# Patient Record
(demographics unavailable — no encounter records)

---

## 2024-10-15 NOTE — PHYSICAL EXAM
[General Appearance - Well Developed] : well developed [General Appearance - Well Nourished] : well nourished [General Appearance - In No Acute Distress] : no acute distress [Normal Conjunctiva] : the conjunctiva exhibited no abnormalities [Normal Oral Mucosa] : normal oral mucosa [] : no respiratory distress [Respiration, Rhythm And Depth] : normal respiratory rhythm and effort [Auscultation Breath Sounds / Voice Sounds] : lungs were clear to auscultation bilaterally [Heart Sounds] : normal S1 and S2 [Arterial Pulses Normal] : the arterial pulses were normal [Edema] : no peripheral edema present [Abdomen Soft] : soft [Abdomen Tenderness] : non-tender [Abnormal Walk] : normal gait [Nail Clubbing] : no clubbing of the fingernails [Cyanosis, Localized] : no localized cyanosis [Oriented To Time, Place, And Person] : oriented to person, place, and time [Impaired Insight] : insight and judgment were intact [Affect] : the affect was normal [FreeTextEntry1] : Irregular rate and rhythm

## 2024-10-15 NOTE — ASSESSMENT
[FreeTextEntry1] : Persistent atrial fibrillation\par  S/P Watchman device\par  Hypertension\par  DM\par  Obesity\par  Hypothyroidism\par

## 2024-10-15 NOTE — DISCUSSION/SUMMARY
[FreeTextEntry1] : The patient is followed by EPS and has seen Dr. Montanez on 9 visits since her one office visit with me She is advised to remain under the care of EP. She was advised to lower her T. cholesterol level to less than 200 mg/dl and LDL to less than 100 mg/dl. Start an exercise program. Maintain a low fat, low cholesterol diet. Weight reduction is advised. Patient is back on  mg and was taken off eliquis by EP.  She is advised to continue her remaining medications. Her metoprolol tartrate was increased to 50 mg Q12h and her diovan dose was decreased to 80 mg QD Repeat  CBC BMP, lipid and hepatic panel was reviewed with the patient and her daughter. Follow up with Dr. Montanez.  Follow up in 6 months [EKG obtained to assist in diagnosis and management of assessed problem(s)] : EKG obtained to assist in diagnosis and management of assessed problem(s)

## 2024-10-15 NOTE — REASON FOR VISIT
[FreeTextEntry1] : Patient presents for a follow up Cardiology visit and review of her lab results. She is followed by Dr. Montanez and has seen him on the last 9 office visits. She is s/p implant of a Watchman device.

## 2025-02-06 NOTE — PROCEDURE
[Large Joint Injection] : Large joint injection [Right] : of the right [Shoulder] : shoulder [Pain] : pain [X-ray evidence of Osteoarthritis on this or prior visit] : x-ray evidence of Osteoarthritis on this or prior visit [Alcohol] : alcohol [Ethyl Chloride sprayed topically] : ethyl chloride sprayed topically [Sterile technique used] : sterile technique used [___ cc    0.5%] : Bupivacaine (Marcaine) ~Vcc of 0.5%  [____] : [unfilled] [] : Patient tolerated procedure well [Call if redness, pain or fever occur] : call if redness, pain or fever occur [Apply ice for 15min out of every hour for the next 12-24 hours as tolerated] : apply ice for 15 minutes out of every hour for the next 12-24 hours as tolerated [Previous OTC use and PT nontherapeutic] : patient has tried OTC's including aspirin, Ibuprofen, Aleve, etc or prescription NSAIDS, and/or exercises at home and/or physical therapy without satisfactory response [Patient had decreased mobility in the joint] : patient had decreased mobility in the joint [Risks, benefits, alternatives discussed / Verbal consent obtained] : the risks benefits, and alternatives have been discussed, and verbal consent was obtained [All ultrasound images have been permanently captured and stored accordingly in our picture archiving and communication system] : All ultrasound images have been permanently captured and stored accordingly in our picture archiving and communication system [Visualization of the needle and placement of injection was performed without complication] : visualization of the needle and placement of injection was performed without complication [de-identified] : Chlorhexidine

## 2025-02-06 NOTE — DISCUSSION/SUMMARY
[de-identified] : A discussion regarding available pain management treatment options occurred with the patient. These included interventional, rehabilitative, pharmacological, and alternative modalities. We will proceed with the following:   Interventional treatment options: 1. Done in office today: Right shoulder steroid injection  The risks, benefits and alternatives of the proposed procedure were explained in detail with the patient.  The risks outlined include, but are not limited to, infection, bleeding, nerve injury, a temporary increase in pain, failure to resolve symptoms, allergic reaction, and possible elevation of blood sugar in diabetics.  All questions were answered to patient's apparent satisfaction and he/she verbalized an understanding.  2. Proceed with a Right shoulder Synvisc injection x3.   - Follow up 2 weeks post injection.   I Brianna Yan attest that this documentation has been prepared under the direction and in the presence of provider Dr. Salazar Rendon.  The documentation recorded by the scribe in my presence, accurately reflects the service I personally performed, and the decisions made by me with my edits as appropriate.   Salazar Rendon, DO

## 2025-02-06 NOTE — PHYSICAL EXAM
[de-identified] : Right shoulder     pain with flexion, abduction AROM   pain with resisted Abduction    + hawkin's   + Stapleton's    no weakness appreciated   no TTP to posterior GH joint, proximal biceps tendon   L shoulder exam  No rashes, erythema, edema  TTP at anterior & posterior GH joint Limited ROM 2/2 to pain Oglesby sign: negative O'briens test: negative LUE: 5/5 strength except 4+/5 deltoid

## 2025-02-06 NOTE — HISTORY OF PRESENT ILLNESS
[FreeTextEntry1] : Ms. Vargas is a 81-year-old female presenting to establish care for her right shoulder pain. She is accompanied by her daughter who is acting as a  as she does not speak English. Her pain as been ongoing for the last 2 years however over the last couple of months, the pain has been progressing. She does have bilateral shoulder pain however the right side is more severe. Her pain is associated with sharp, stabbing, throbbing and aching sensations. She has significant limitation in her range of motion due to the pain. Her pain is rated at a 8/10 on the pain scale. Her pain is present daily and limits her ADLs.

## 2025-02-24 NOTE — PHYSICAL EXAM
[de-identified] : Right shoulder     pain with flexion, abduction AROM   pain with resisted Abduction    + hawkin's   + Stapleton's    no weakness appreciated   no TTP to posterior GH joint, proximal biceps tendon   L shoulder exam  No rashes, erythema, edema  TTP at anterior & posterior GH joint Limited ROM 2/2 to pain Oglesby sign: negative O'briens test: negative LUE: 5/5 strength except 4+/5 deltoid

## 2025-02-24 NOTE — DISCUSSION/SUMMARY
[de-identified] : A discussion regarding available pain management treatment options occurred with the patient. These included interventional, rehabilitative, pharmacological, and alternative modalities. We will proceed with the following:   Interventional treatment options: 1. Proceed with a Right shoulder Syvisc injection x3.  The risks, benefits and alternatives of the proposed procedure were explained in detail with the patient.  The risks outlined include, but are not limited to, infection, bleeding, nerve injury, a temporary increase in pain, failure to resolve symptoms, allergic reaction, and possible elevation of blood sugar in diabetics.  All questions were answered to patient's apparent satisfaction and he/she verbalized an understanding.  Medications: 1. Ordered Acetaminophen-Codeine  mg q6h prn (7-say supply, 28 tablets)  ISTOP Checked Reference # [[id number]]  The R/B/A of chronic opiate therapy for non-malignant pain including, but not limited to, the risk of addiction, overdose, respiratory depression, and death was discussed and accepted by the patient.  Patient was also informed that they should not consume alcohol or drive a motor vehicle under any circumstances while taking opiate pain medications. The patient was explained the narcotic contract and given adequate time and explanation regarding our policies. The patient agreed and signed our narcotic contract.  istop registry was checked and verified by myself. Urine toxicology screen will be performed at random and occasionally to monitor for any inconsistencies. There is a zero tolerance policy for inconsistencies and are grounds for discharge from the practice at the discretion of the physician and the physician alone. The patient should never expect any controlled substance be sent over the phone or without an in office visit.  - Follow up 2 weeks post injection.   I Brianna Yan attest that this documentation has been prepared under the direction and in the presence of provider Dr. Salazar Rendon.  The documentation recorded by the scribe in my presence, accurately reflects the service I personally performed, and the decisions made by me with my edits as appropriate.   Salazar Rendon, DO

## 2025-02-24 NOTE — PHYSICAL EXAM
[de-identified] : Right shoulder     pain with flexion, abduction AROM   pain with resisted Abduction    + hawkin's   + Stapleton's    no weakness appreciated   no TTP to posterior GH joint, proximal biceps tendon   L shoulder exam  No rashes, erythema, edema  TTP at anterior & posterior GH joint Limited ROM 2/2 to pain Oglesby sign: negative O'briens test: negative LUE: 5/5 strength except 4+/5 deltoid

## 2025-02-24 NOTE — DISCUSSION/SUMMARY
[de-identified] : A discussion regarding available pain management treatment options occurred with the patient. These included interventional, rehabilitative, pharmacological, and alternative modalities. We will proceed with the following:   Interventional treatment options: 1. Proceed with a Right shoulder Syvisc injection x3.  The risks, benefits and alternatives of the proposed procedure were explained in detail with the patient.  The risks outlined include, but are not limited to, infection, bleeding, nerve injury, a temporary increase in pain, failure to resolve symptoms, allergic reaction, and possible elevation of blood sugar in diabetics.  All questions were answered to patient's apparent satisfaction and he/she verbalized an understanding.  Medications: 1. Ordered Acetaminophen-Codeine  mg q6h prn (7-say supply, 28 tablets)  ISTOP Checked Reference # [[id number]]  The R/B/A of chronic opiate therapy for non-malignant pain including, but not limited to, the risk of addiction, overdose, respiratory depression, and death was discussed and accepted by the patient.  Patient was also informed that they should not consume alcohol or drive a motor vehicle under any circumstances while taking opiate pain medications. The patient was explained the narcotic contract and given adequate time and explanation regarding our policies. The patient agreed and signed our narcotic contract.  istop registry was checked and verified by myself. Urine toxicology screen will be performed at random and occasionally to monitor for any inconsistencies. There is a zero tolerance policy for inconsistencies and are grounds for discharge from the practice at the discretion of the physician and the physician alone. The patient should never expect any controlled substance be sent over the phone or without an in office visit.  - Follow up 2 weeks post injection.   I Brianna Yan attest that this documentation has been prepared under the direction and in the presence of provider Dr. Salazar Rendon.  The documentation recorded by the scribe in my presence, accurately reflects the service I personally performed, and the decisions made by me with my edits as appropriate.   Salazar Rendon, DO

## 2025-02-24 NOTE — HISTORY OF PRESENT ILLNESS
[FreeTextEntry1] : Ms. Vargas is a 81-year-old female presenting to establish care for her right shoulder pain. She is accompanied by her daughter who is acting as a  as she does not speak English. Her pain as been ongoing for the last 2 years however over the last couple of months, the pain has been progressing. She does have bilateral shoulder pain however the right side is more severe. Her pain is associated with sharp, stabbing, throbbing and aching sensations. She has significant limitation in her range of motion due to the pain. Her pain is rated at a 8/10 on the pain scale. Her pain is present daily and limits her ADLs.   2-: Revisit encounter. She is being seen today VIA Telehealth encounter. She is accompanied by her mother today. She is presenting today with ongoing pan in the right shoulder. At her last office visit, she underwent a right shoulder injection which only provided her with little relief. She has trialed Meloxicam with no relief. She has been taking 2 tablets of Gabapentin 200 mg along with OTC Motrin which has been helping relieve her pain. She has now been experiencing pain starting at the shoulder and referring into the right elbow and right hand.

## 2025-03-18 NOTE — PROCEDURE
[Large Joint Injection] : Large joint injection [Shoulder] : shoulder [Pain] : pain [X-ray evidence of Osteoarthritis on this or prior visit] : x-ray evidence of Osteoarthritis on this or prior visit [Alcohol] : alcohol [Ethyl Chloride sprayed topically] : ethyl chloride sprayed topically [Sterile technique used] : sterile technique used [Synvisc-one (48mg)] : 48mg of Synvisc-one [] : Patient tolerated procedure well [Call if redness, pain or fever occur] : call if redness, pain or fever occur [Apply ice for 15min out of every hour for the next 12-24 hours as tolerated] : apply ice for 15 minutes out of every hour for the next 12-24 hours as tolerated [Previous OTC use and PT nontherapeutic] : patient has tried OTC's including aspirin, Ibuprofen, Aleve, etc or prescription NSAIDS, and/or exercises at home and/or physical therapy without satisfactory response [Patient had decreased mobility in the joint] : patient had decreased mobility in the joint [Risks, benefits, alternatives discussed / Verbal consent obtained] : the risks benefits, and alternatives have been discussed, and verbal consent was obtained [Right] : of the right [de-identified] : Chlorhexidine

## 2025-03-18 NOTE — PROCEDURE
[Large Joint Injection] : Large joint injection [Shoulder] : shoulder [Pain] : pain [X-ray evidence of Osteoarthritis on this or prior visit] : x-ray evidence of Osteoarthritis on this or prior visit [Alcohol] : alcohol [Ethyl Chloride sprayed topically] : ethyl chloride sprayed topically [Sterile technique used] : sterile technique used [Synvisc-one (48mg)] : 48mg of Synvisc-one [] : Patient tolerated procedure well [Call if redness, pain or fever occur] : call if redness, pain or fever occur [Apply ice for 15min out of every hour for the next 12-24 hours as tolerated] : apply ice for 15 minutes out of every hour for the next 12-24 hours as tolerated [Previous OTC use and PT nontherapeutic] : patient has tried OTC's including aspirin, Ibuprofen, Aleve, etc or prescription NSAIDS, and/or exercises at home and/or physical therapy without satisfactory response [Patient had decreased mobility in the joint] : patient had decreased mobility in the joint [Risks, benefits, alternatives discussed / Verbal consent obtained] : the risks benefits, and alternatives have been discussed, and verbal consent was obtained [Right] : of the right [de-identified] : Chlorhexidine

## 2025-04-10 NOTE — PHYSICAL EXAM
[de-identified] : Right shoulder     pain with flexion, abduction AROM   pain with resisted Abduction    + hawkin's   + Stapleton's    no weakness appreciated   no TTP to posterior GH joint, proximal biceps tendon   L shoulder exam  No rashes, erythema, edema  TTP at anterior & posterior GH joint Limited ROM 2/2 to pain Oglesby sign: + O'briens test: + LUE: 5/5 strength except 4+/5 deltoid

## 2025-04-10 NOTE — DISCUSSION/SUMMARY
[de-identified] : A discussion regarding available pain management treatment options occurred with the patient. These included interventional, rehabilitative, pharmacological, and alternative modalities. We will proceed with the following:   Interventional treatment options: 1. Proceed with a left shoulder Synvisc one The risks, benefits and alternatives of the proposed procedure were explained in detail with the patient.  The risks outlined include, but are not limited to, infection, bleeding, nerve injury, a temporary increase in pain, failure to resolve symptoms, allergic reaction, and possible elevation of blood sugar in diabetics.  All questions were answered to patient's apparent satisfaction and he/she verbalized an understanding.  Medications: 1. Ordered compound cream from [facility] pharmacy to be applied to affected area twice daily.  2. Ordered Naproxen 500 mg q12h (30-day supply, 60 tablets) 3. Ordered Gabapentin 300 mg q8h (30-day supply, 90 tablets)  Gabapentin. Potential side effects were discussed which included dizziness, sedation, and pedal edema to name a few. If the patient cannot tolerate these side effects should they occur, then they will stop the medication. If the patient experiences sedation, they understand that they should not drive. The usage of the medication was discussed and the patient understands that it is an anti-epileptic medication used for neuropathic pain and that the pain relief from this medication will likely be subtle, and that hopefully in combination with the other treatments we are offering we will be able to get some additive relief.   Trial compound lotion consisting of muscle relaxants, nonsteroidal anti-inflammatories and local anesthetic.  We have found out of 70% of our patients are getting 30% or greater relief for 6-10 hours a day, decreasing the escalation onto pain medication with the risk of systemic side effects; constipation, drowsiness etc and the risk of addiction, tolerance and withdrawals.   - Follow up 2-3 months.   I Brianna Yan attest that this documentation has been prepared under the direction and in the presence of provider Dr. Salazar Rendon.  The documentation recorded by the scribe in my presence, accurately reflects the service I personally performed, and the decisions made by me with my edits as appropriate.   Salazar Rendon, DO

## 2025-04-18 NOTE — PROCEDURE
[Large Joint Injection] : Large joint injection [Left] : of the left [Shoulder] : shoulder [Pain] : pain [X-ray evidence of Osteoarthritis on this or prior visit] : x-ray evidence of Osteoarthritis on this or prior visit [Alcohol] : alcohol [Ethyl Chloride sprayed topically] : ethyl chloride sprayed topically [Sterile technique used] : sterile technique used [Synvisc-one (48mg)] : 48mg of Synvisc-one [] : Patient tolerated procedure well [Call if redness, pain or fever occur] : call if redness, pain or fever occur [Apply ice for 15min out of every hour for the next 12-24 hours as tolerated] : apply ice for 15 minutes out of every hour for the next 12-24 hours as tolerated [Previous OTC use and PT nontherapeutic] : patient has tried OTC's including aspirin, Ibuprofen, Aleve, etc or prescription NSAIDS, and/or exercises at home and/or physical therapy without satisfactory response [Patient had decreased mobility in the joint] : patient had decreased mobility in the joint [Risks, benefits, alternatives discussed / Verbal consent obtained] : the risks benefits, and alternatives have been discussed, and verbal consent was obtained [de-identified] : Chlorhexidine

## 2025-04-18 NOTE — PROCEDURE
[Large Joint Injection] : Large joint injection [Left] : of the left [Shoulder] : shoulder [Pain] : pain [X-ray evidence of Osteoarthritis on this or prior visit] : x-ray evidence of Osteoarthritis on this or prior visit [Alcohol] : alcohol [Ethyl Chloride sprayed topically] : ethyl chloride sprayed topically [Sterile technique used] : sterile technique used [Synvisc-one (48mg)] : 48mg of Synvisc-one [] : Patient tolerated procedure well [Call if redness, pain or fever occur] : call if redness, pain or fever occur [Apply ice for 15min out of every hour for the next 12-24 hours as tolerated] : apply ice for 15 minutes out of every hour for the next 12-24 hours as tolerated [Previous OTC use and PT nontherapeutic] : patient has tried OTC's including aspirin, Ibuprofen, Aleve, etc or prescription NSAIDS, and/or exercises at home and/or physical therapy without satisfactory response [Patient had decreased mobility in the joint] : patient had decreased mobility in the joint [Risks, benefits, alternatives discussed / Verbal consent obtained] : the risks benefits, and alternatives have been discussed, and verbal consent was obtained [de-identified] : Chlorhexidine

## 2025-05-01 NOTE — HISTORY OF PRESENT ILLNESS
[FreeTextEntry1] : Persistent Atrial fibrillation His of a cardiac catheterization with patent coronaries in the past S/P Watchman ALY occluder Essential Hypertension Anemia

## 2025-05-01 NOTE — DISCUSSION/SUMMARY
[FreeTextEntry1] : The patient is followed by EPS and has seen Dr. Montanez on multiple visits since her one office visit with me She is advised to remain under the care of EP. EKG: Atrial fibrillation with a HR of 88 bpm RBBB She was advised to lower her T. cholesterol level to less than 200 mg/dl and LDL to less than 100 mg/dl. Start an exercise program. Maintain a low fat, low cholesterol diet. Weight reduction is advised. Patient is back on  mg and was taken off eliquis by EP.  She is advised to continue her remaining medications. Her metoprolol tartrate was increased to 50 mg Q12h and her diovan dose was decreased to 80 mg QD Repeat  CBC BMP, lipid and hepatic panel was reviewed with the patient and her daughter. Follow up with Dr. Montanez.  Follow up in 6 months [EKG obtained to assist in diagnosis and management of assessed problem(s)] : EKG obtained to assist in diagnosis and management of assessed problem(s)

## 2025-05-01 NOTE — PHYSICAL EXAM
[General Appearance - Well Developed] : well developed [General Appearance - Well Nourished] : well nourished [General Appearance - In No Acute Distress] : no acute distress [Normal Conjunctiva] : the conjunctiva exhibited no abnormalities [Normal Oral Mucosa] : normal oral mucosa [] : no respiratory distress [Respiration, Rhythm And Depth] : normal respiratory rhythm and effort [Auscultation Breath Sounds / Voice Sounds] : lungs were clear to auscultation bilaterally [Heart Sounds] : normal S1 and S2 [Arterial Pulses Normal] : the arterial pulses were normal [Edema] : no peripheral edema present [Abdomen Soft] : soft [Abdomen Tenderness] : non-tender [Abnormal Walk] : normal gait [Nail Clubbing] : no clubbing of the fingernails [Cyanosis, Localized] : no localized cyanosis [Oriented To Time, Place, And Person] : oriented to person, place, and time [Impaired Insight] : insight and judgment were intact [Affect] : the affect was normal [FreeTextEntry1] : Irregular rate and rhythm, Grade II/VI systolic murmur at RSB

## 2025-05-14 NOTE — PHYSICAL EXAM
[No Acute Distress] : no acute distress [Normal Oropharynx] : normal oropharynx [Normal Appearance] : normal appearance [No Neck Mass] : no neck mass [Normal Rate/Rhythm] : normal rate/rhythm [Normal S1, S2] : normal s1, s2 [No Murmurs] : no murmurs [No Resp Distress] : no resp distress [No Abnormalities] : no abnormalities [Benign] : benign [Normal Gait] : normal gait [No Clubbing] : no clubbing [No Cyanosis] : no cyanosis [FROM] : FROM [2+ Pitting] : 2+ pitting [Normal Color/ Pigmentation] : normal color/ pigmentation [No Focal Deficits] : no focal deficits [Oriented x3] : oriented x3 [Normal Affect] : normal affect [TextBox_68] : minimal right-base crackles [TextBox_105] : R>L lower extremity edema

## 2025-05-14 NOTE — PROCEDURE
[FreeTextEntry1] : CXR PA+Lateral performed in-office bilateral costophrenic angles are sharp without pleural effusion mediastinum is within normal parameters Large airways are patent and midline, without stenosis or obstruction no hilar lymphadenopathy noted bilateral diaphragms appropriate lung parenchyma bilaterally clear without infiltrates improvement in bibasilar opacities from prior film in 2022

## 2025-05-14 NOTE — HISTORY OF PRESENT ILLNESS
[Never] : never [TextBox_4] : Ms. HATFIELD is a 81 year woman with a medical history significant for asthma and MCKAY presenting today to the clinic for progressive dyspnea on exertion over the past few weeks. She was previously following with Dr Lopez, however has not been seen in the last 3 years and is now following with me.   She underwent CT chest in 2022 at [facility] radiology demonstrating bilateral groundglass opacities and right-sided pleural effusion with cardiomegaly, most consistent with cardiogenic pulmonary edema.  She now returns due to worsening shortness of breath.  It has been progressing over the past 3-4 weeks.  Denies weight gain, leg swelling.  Dyspnea mainly on exertion.  Went to  on Easter when she was believed to be wheezing, where she was given nebulizer therapy and several days of steroids and started on Budesonide.  Patient did improve with steroid course.  She is not using her CPAP

## 2025-06-25 NOTE — DATA REVIEWED
[FreeTextEntry1] :  x-rays reviewed on the [facility] PACS system of her left shoulder show a proximal humerus fracture with diffuse degenerative changes in the left humeral joint and AC joint.

## 2025-06-25 NOTE — PHYSICAL EXAM
[de-identified] : Physical exam of her left shoulder: She has mild swelling.  Negative ecchymosis.  Tender to palpation of the proximal humerus.  She is neurovascularly intact.  Good motion of the wrist and hand.  She does have arthritis in her fingers.

## 2025-06-25 NOTE — HISTORY OF PRESENT ILLNESS
[de-identified] : Patient is an 81-year-old female here for evaluation of her left shoulder.  She is accompanied by her daughter and son-in-law.  She states that she fell and injured her left shoulder yesterday.  She went to [facility] and was diagnosed with a proximal humerus fracture.  She has a long history of arthritis in her shoulders that she is being treated for by pain management.

## 2025-06-25 NOTE — DISCUSSION/SUMMARY
[de-identified] : Patient has a proximal humerus fracture.  She also has a history of arthritis.  She will ice and rest her shoulder.  She is already on a pain management regimen.  I did consult with Dr. Rendon who agreed that she can take the Tylenol with codeine for pain as needed in addition to her other regimen.  I prescribed her a weeks worth of medication.  He will see her back in the office next week to refill it if necessary.  She will follow-up in the office here in about 2 weeks for repeat x-ray and evaluation in our trauma department.  All questions were answered today.  She and her family are amendable to this plan.

## 2025-07-01 NOTE — HISTORY OF PRESENT ILLNESS
[FreeTextEntry1] : Ms. Vargas is a 81-year-old female presenting to establish care for her right shoulder pain. She is accompanied by her daughter who is acting as a  as she does not speak English. Her pain as been ongoing for the last 2 years however over the last couple of months, the pain has been progressing. She does have bilateral shoulder pain however the right side is more severe. Her pain is associated with sharp, stabbing, throbbing and aching sensations. She has significant limitation in her range of motion due to the pain. Her pain is rated at a 8/10 on the pain scale. Her pain is present daily and limits her ADLs.   2-: Revisit encounter. She is being seen today VIA Telehealth encounter. She is accompanied by her mother today. She is presenting today with ongoing pan in the right shoulder. At her last office visit, she underwent a right shoulder injection which only provided her with little relief. She has trialed Meloxicam with no relief. She has been taking 2 tablets of Gabapentin 200 mg along with OTC Motrin which has been helping relieve her pain. She has now been experiencing pain starting at the shoulder and referring into the right elbow and right hand.   4-: Revisit encounter. She is accompanied by her daughter today.   Since her last office visit, she underwent a Right shoulder Synvisc one injection. She only affords about 30% relief. She does still continue with some ongoing pain. Her pain is associated with sharp, stabbing, throbbing and aching sensations. She has significant limitation in her range of motion due to the pain. She rates the pain at a 7/10 on the pain scale.   7-1-2025: Revisit encounter. She is accompanied by her daughter today.   She is presenting today with severe pain in the left shoulder. She recently fell and injured the left shoulder. She was taken to [facility] and was diagnosed with a proximal humerus fracture. Her arm is currently in a sling. She is medically managing her pain with Tylenol No.3 along with Ibuprofen and Gabapentin. She has been experiencing some edema in her legs. Her pain is currently rated at a 10/10 on the pain scale. Her pain is present all throughout the day.

## 2025-07-01 NOTE — DISCUSSION/SUMMARY
[de-identified] : A discussion regarding available pain management treatment options occurred with the patient. These included interventional, rehabilitative, pharmacological, and alternative modalities. We will proceed with the following:   Interventional treatment options: - Deferred.   Medications: 1. Ordered Tramadol 50 mg qhs prn for severe pain (7-day supply, 28 tablets) - discontinue Gabapentin as she is developing leg edema.  2. Ordered Pregabalin 50 mg q8h prn (30-day supply)  ISTOP Checked [id number] The R/B/A of chronic opiate therapy for non-malignant pain including, but not limited to, the risk of addiction, overdose, respiratory depression, and death was discussed and accepted by the patient.  Patient was also informed that they should not consume alcohol or drive a motor vehicle under any circumstances while taking opiate pain medications. The patient was explained the narcotic contract and given adequate time and explanation regarding our policies. The patient agreed and signed our narcotic contract.  istop registry was checked and verified by myself. Urine toxicology screen will be performed at random and occasionally to monitor for any inconsistencies. There is a zero tolerance policy for inconsistencies and are grounds for discharge from the practice at the discretion of the physician and the physician alone. The patient should never expect any controlled substance be sent over the phone or without an in office visit.  The patient has reviewed and signed an opioid agreement delineating the use of opioid pain medications within our practice. All questions answered to patient's satisfaction.  We are going to start Pregabalin. Potential side effects were discussed which included dizziness, sedation, and pedal edema to name a few. If the patient cannot tolerate these side effects should they occur, then they will stop the medication. If the patient experiences sedation, they understand that they should not drive. The usage of the medication was discussed and the patient understands that it is an anti-epileptic medication used for neuropathic pain and that the pain relief from this medication will likely be subtle, and that hopefully in combination with the other treatments we are offering we will be able to get some additive relief.  - Follow up in 2 weeks.   I Brianna Yan attest that this documentation has been prepared under the direction and in the presence of provider Dr. Salazar Rendon.  The documentation recorded by the scribe in my presence, accurately reflects the service I personally performed, and the decisions made by me with my edits as appropriate.   Salazar Rendon, DO

## 2025-07-01 NOTE — HISTORY OF PRESENT ILLNESS
[FreeTextEntry1] : Ms. Vargas is a 81-year-old female presenting to establish care for her right shoulder pain. She is accompanied by her daughter who is acting as a  as she does not speak English. Her pain as been ongoing for the last 2 years however over the last couple of months, the pain has been progressing. She does have bilateral shoulder pain however the right side is more severe. Her pain is associated with sharp, stabbing, throbbing and aching sensations. She has significant limitation in her range of motion due to the pain. Her pain is rated at a 8/10 on the pain scale. Her pain is present daily and limits her ADLs.   2-: Revisit encounter. She is being seen today VIA Telehealth encounter. She is accompanied by her mother today. She is presenting today with ongoing pan in the right shoulder. At her last office visit, she underwent a right shoulder injection which only provided her with little relief. She has trialed Meloxicam with no relief. She has been taking 2 tablets of Gabapentin 200 mg along with OTC Motrin which has been helping relieve her pain. She has now been experiencing pain starting at the shoulder and referring into the right elbow and right hand.   4-: Revisit encounter. She is accompanied by her daughter today.   Since her last office visit, she underwent a Right shoulder Synvisc one injection. She only affords about 30% relief. She does still continue with some ongoing pain. Her pain is associated with sharp, stabbing, throbbing and aching sensations. She has significant limitation in her range of motion due to the pain. She rates the pain at a 7/10 on the pain scale.   7-1-2025: Revisit encounter. She is accompanied by her daughter today.   She is presenting today with severe pain in the left shoulder. She recently fell and injured the left shoulder. She was taken to [facility] and was diagnosed with a proximal humerus fracture. Her arm is currently in a sling. She is medically managing her pain with Tylenol No.3 along with Ibuprofen and Gabapentin. She has been experiencing some edema in her legs. Her pain is currently rated at a 10/10 on the pain scale. Her pain is present all throughout the day.

## 2025-07-01 NOTE — PHYSICAL EXAM
[de-identified] : Right shoulder     pain with flexion, abduction AROM   pain with resisted Abduction    + hawkin's   + Stapleton's    no weakness appreciated   no TTP to posterior GH joint, proximal biceps tendon   L shoulder exam  No rashes, erythema, edema  TTP at anterior & posterior GH joint Limited ROM 2/2 to pain Oglesby sign: + O'briens test: + LUE: 5/5 strength except 4+/5 deltoid

## 2025-07-01 NOTE — PHYSICAL EXAM
[de-identified] : Right shoulder     pain with flexion, abduction AROM   pain with resisted Abduction    + hawkin's   + Stapleton's    no weakness appreciated   no TTP to posterior GH joint, proximal biceps tendon   L shoulder exam  No rashes, erythema, edema  TTP at anterior & posterior GH joint Limited ROM 2/2 to pain Oglesby sign: + O'briens test: + LUE: 5/5 strength except 4+/5 deltoid

## 2025-07-01 NOTE — DISCUSSION/SUMMARY
[de-identified] : A discussion regarding available pain management treatment options occurred with the patient. These included interventional, rehabilitative, pharmacological, and alternative modalities. We will proceed with the following:   Interventional treatment options: - Deferred.   Medications: 1. Ordered Tramadol 50 mg qhs prn for severe pain (7-day supply, 28 tablets) - discontinue Gabapentin as she is developing leg edema.  2. Ordered Pregabalin 50 mg q8h prn (30-day supply)  ISTOP Checked [id number] The R/B/A of chronic opiate therapy for non-malignant pain including, but not limited to, the risk of addiction, overdose, respiratory depression, and death was discussed and accepted by the patient.  Patient was also informed that they should not consume alcohol or drive a motor vehicle under any circumstances while taking opiate pain medications. The patient was explained the narcotic contract and given adequate time and explanation regarding our policies. The patient agreed and signed our narcotic contract.  istop registry was checked and verified by myself. Urine toxicology screen will be performed at random and occasionally to monitor for any inconsistencies. There is a zero tolerance policy for inconsistencies and are grounds for discharge from the practice at the discretion of the physician and the physician alone. The patient should never expect any controlled substance be sent over the phone or without an in office visit.  The patient has reviewed and signed an opioid agreement delineating the use of opioid pain medications within our practice. All questions answered to patient's satisfaction.  We are going to start Pregabalin. Potential side effects were discussed which included dizziness, sedation, and pedal edema to name a few. If the patient cannot tolerate these side effects should they occur, then they will stop the medication. If the patient experiences sedation, they understand that they should not drive. The usage of the medication was discussed and the patient understands that it is an anti-epileptic medication used for neuropathic pain and that the pain relief from this medication will likely be subtle, and that hopefully in combination with the other treatments we are offering we will be able to get some additive relief.  - Follow up in 2 weeks.   I Brianna Yan attest that this documentation has been prepared under the direction and in the presence of provider Dr. Salazar Rendon.  The documentation recorded by the scribe in my presence, accurately reflects the service I personally performed, and the decisions made by me with my edits as appropriate.   Salazar Rendon, DO

## 2025-07-09 NOTE — DATA REVIEWED
[FreeTextEntry1] :   3 x-ray views taken in the office today of her left shoulder show an impacted displaced proximal humerus fracture. I independently reviewed these x-rays in the office today.

## 2025-07-09 NOTE — HISTORY OF PRESENT ILLNESS
[de-identified] : Patient is an 81-year-old female here for repeat evaluation of her left shoulder.  She is about 2 weeks status post a displaced proximal humerus fracture.  She is accompanied by her daughter.  She is feeling better.

## 2025-07-09 NOTE — PHYSICAL EXAM
[de-identified] : Physical exam of the left shoulder: Swelling and ecchymosis is improving.  She still has some swelling and ecchymosis in her hands.  She is neurovascularly intact.  Good range of motion of the wrist and hand.  Her sensory and motor are intact.

## 2025-07-09 NOTE — DISCUSSION/SUMMARY
[de-identified] : She is 2 weeks any injury.  She will continue to ice and rest the shoulder.  She will work on range of motion of the elbow, wrist, and hand.  I will see her back in 3 weeks for repeat x-ray and evaluation.  At that point we will start her on therapy.  She may start pendulums in about 2 weeks.  She will continue her pain regimen that was given to her by Dr. Rendon.  All questions were answered today.

## 2025-07-18 NOTE — HISTORY OF PRESENT ILLNESS
[FreeTextEntry1] : Ms. Vargas is a 81-year-old female presenting to establish care for her right shoulder pain. She is accompanied by her daughter who is acting as a  as she does not speak English. Her pain as been ongoing for the last 2 years however over the last couple of months, the pain has been progressing. She does have bilateral shoulder pain however the right side is more severe. Her pain is associated with sharp, stabbing, throbbing and aching sensations. She has significant limitation in her range of motion due to the pain. Her pain is rated at a 8/10 on the pain scale. Her pain is present daily and limits her ADLs.   2-: Revisit encounter. She is being seen today VIA Telehealth encounter. She is accompanied by her mother today. She is presenting today with ongoing pan in the right shoulder. At her last office visit, she underwent a right shoulder injection which only provided her with little relief. She has trialed Meloxicam with no relief. She has been taking 2 tablets of Gabapentin 200 mg along with OTC Motrin which has been helping relieve her pain. She has now been experiencing pain starting at the shoulder and referring into the right elbow and right hand.   4-: Revisit encounter. She is accompanied by her daughter today.   Since her last office visit, she underwent a Right shoulder Synvisc one injection. She only affords about 30% relief. She does still continue with some ongoing pain. Her pain is associated with sharp, stabbing, throbbing and aching sensations. She has significant limitation in her range of motion due to the pain. She rates the pain at a 7/10 on the pain scale.   7-1-2025: Revisit encounter. She is accompanied by her daughter today.   She is presenting today with severe pain in the left shoulder. She recently fell and injured the left shoulder. She was taken to [facility] and was diagnosed with a proximal humerus fracture. Her arm is currently in a sling. She is medically managing her pain with Tylenol No.3 along with Ibuprofen and Gabapentin. She has been experiencing some edema in her legs. Her pain is currently rated at a 10/10 on the pain scale. Her pain is present all throughout the day.   7-: Revisit encounter. She is being seen today VIA telehealth encounter.   She is presenting today with ongoing pain in the left shoulder. She has been medically managing her pain with Pregabalin 50 mg q8h and Tramadol 50 mg q12h prn.  This medication regimen provides satisfactory relief of at least 50% and allows the patient to perform their ADLs and improve overall function. The patient uses the medication appropriately however she has been complaining of some palpitations.

## 2025-07-18 NOTE — PHYSICAL EXAM
[de-identified] : Right shoulder     pain with flexion, abduction AROM   pain with resisted Abduction    + hawkin's   + Stapleton's    no weakness appreciated   no TTP to posterior GH joint, proximal biceps tendon   L shoulder exam  No rashes, erythema, edema  TTP at anterior & posterior GH joint Limited ROM 2/2 to pain Oglesby sign: + O'briens test: + LUE: 5/5 strength except 4+/5 deltoid

## 2025-07-18 NOTE — DISCUSSION/SUMMARY
[de-identified] : A discussion regarding available pain management treatment options occurred with the patient. These included interventional, rehabilitative, pharmacological, and alternative modalities. We will proceed with the following:   Interventional treatment options: - Deferred.   Medications: - continue with Tramadol 50 mg qhs prn for severe pain (not due for refill) 2. Ordered Pregabalin 75 mg q8h prn (30-day supply)  ISTOP Checked Reference # [ [id number]          ]  The R/B/A of chronic opiate therapy for non-malignant pain including, but not limited to, the risk of addiction, overdose, respiratory depression, and death was discussed and accepted by the patient.  Patient was also informed that they should not consume alcohol or drive a motor vehicle under any circumstances while taking opiate pain medications. The patient was explained the narcotic contract and given adequate time and explanation regarding our policies. The patient agreed and signed our narcotic contract.  istop registry was checked and verified by myself. Urine toxicology screen will be performed at random and occasionally to monitor for any inconsistencies. There is a zero tolerance policy for inconsistencies and are grounds for discharge from the practice at the discretion of the physician and the physician alone. The patient should never expect any controlled substance be sent over the phone or without an in office visit.  The patient has reviewed and signed an opioid agreement delineating the use of opioid pain medications within our practice. All questions answered to patient's satisfaction.  We are going to start Pregabalin. Potential side effects were discussed which included dizziness, sedation, and pedal edema to name a few. If the patient cannot tolerate these side effects should they occur, then they will stop the medication. If the patient experiences sedation, they understand that they should not drive. The usage of the medication was discussed and the patient understands that it is an anti-epileptic medication used for neuropathic pain and that the pain relief from this medication will likely be subtle, and that hopefully in combination with the other treatments we are offering we will be able to get some additive relief.  - Follow up in 4 weeks.   I Brianna Yan attest that this documentation has been prepared under the direction and in the presence of provider Dr. Salazar Rendon.  The documentation recorded by the scribe in my presence, accurately reflects the service I personally performed, and the decisions made by me with my edits as appropriate.   Salazar Rendon, DO

## 2025-07-18 NOTE — PHYSICAL EXAM
[de-identified] : Right shoulder     pain with flexion, abduction AROM   pain with resisted Abduction    + hawkin's   + Stapleton's    no weakness appreciated   no TTP to posterior GH joint, proximal biceps tendon   L shoulder exam  No rashes, erythema, edema  TTP at anterior & posterior GH joint Limited ROM 2/2 to pain Oglesby sign: + O'briens test: + LUE: 5/5 strength except 4+/5 deltoid

## 2025-07-18 NOTE — DISCUSSION/SUMMARY
[de-identified] : A discussion regarding available pain management treatment options occurred with the patient. These included interventional, rehabilitative, pharmacological, and alternative modalities. We will proceed with the following:   Interventional treatment options: - Deferred.   Medications: - continue with Tramadol 50 mg qhs prn for severe pain (not due for refill) 2. Ordered Pregabalin 75 mg q8h prn (30-day supply)  ISTOP Checked Reference # [ [id number]          ]  The R/B/A of chronic opiate therapy for non-malignant pain including, but not limited to, the risk of addiction, overdose, respiratory depression, and death was discussed and accepted by the patient.  Patient was also informed that they should not consume alcohol or drive a motor vehicle under any circumstances while taking opiate pain medications. The patient was explained the narcotic contract and given adequate time and explanation regarding our policies. The patient agreed and signed our narcotic contract.  istop registry was checked and verified by myself. Urine toxicology screen will be performed at random and occasionally to monitor for any inconsistencies. There is a zero tolerance policy for inconsistencies and are grounds for discharge from the practice at the discretion of the physician and the physician alone. The patient should never expect any controlled substance be sent over the phone or without an in office visit.  The patient has reviewed and signed an opioid agreement delineating the use of opioid pain medications within our practice. All questions answered to patient's satisfaction.  We are going to start Pregabalin. Potential side effects were discussed which included dizziness, sedation, and pedal edema to name a few. If the patient cannot tolerate these side effects should they occur, then they will stop the medication. If the patient experiences sedation, they understand that they should not drive. The usage of the medication was discussed and the patient understands that it is an anti-epileptic medication used for neuropathic pain and that the pain relief from this medication will likely be subtle, and that hopefully in combination with the other treatments we are offering we will be able to get some additive relief.  - Follow up in 4 weeks.   I Brianna Yna attest that this documentation has been prepared under the direction and in the presence of provider Dr. Salazar Rendon.  The documentation recorded by the scribe in my presence, accurately reflects the service I personally performed, and the decisions made by me with my edits as appropriate.   Salazar Rendon, DO

## 2025-07-18 NOTE — HISTORY OF PRESENT ILLNESS
[FreeTextEntry1] : Ms. Vargas is a 81-year-old female presenting to establish care for her right shoulder pain. She is accompanied by her daughter who is acting as a  as she does not speak English. Her pain as been ongoing for the last 2 years however over the last couple of months, the pain has been progressing. She does have bilateral shoulder pain however the right side is more severe. Her pain is associated with sharp, stabbing, throbbing and aching sensations. She has significant limitation in her range of motion due to the pain. Her pain is rated at a 8/10 on the pain scale. Her pain is present daily and limits her ADLs.   2-: Revisit encounter. She is being seen today VIA Telehealth encounter. She is accompanied by her mother today. She is presenting today with ongoing pan in the right shoulder. At her last office visit, she underwent a right shoulder injection which only provided her with little relief. She has trialed Meloxicam with no relief. She has been taking 2 tablets of Gabapentin 200 mg along with OTC Motrin which has been helping relieve her pain. She has now been experiencing pain starting at the shoulder and referring into the right elbow and right hand.   4-: Revisit encounter. She is accompanied by her daughter today.   Since her last office visit, she underwent a Right shoulder Synvisc one injection. She only affords about 30% relief. She does still continue with some ongoing pain. Her pain is associated with sharp, stabbing, throbbing and aching sensations. She has significant limitation in her range of motion due to the pain. She rates the pain at a 7/10 on the pain scale.   7-1-2025: Revisit encounter. She is accompanied by her daughter today.   She is presenting today with severe pain in the left shoulder. She recently fell and injured the left shoulder. She was taken to [facility] and was diagnosed with a proximal humerus fracture. Her arm is currently in a sling. She is medically managing her pain with Tylenol No.3 along with Ibuprofen and Gabapentin. She has been experiencing some edema in her legs. Her pain is currently rated at a 10/10 on the pain scale. Her pain is present all throughout the day.   7-: Revisit encounter. She is being seen today VIA telehealth encounter.   She is presenting today with ongoing pain in the left shoulder. She has been medically managing her pain with Pregabalin 50 mg q8h and Tramadol 50 mg q12h prn.  This medication regimen provides satisfactory relief of at least 50% and allows the patient to perform their ADLs and improve overall function. The patient uses the medication appropriately however she has been complaining of some palpitations.

## 2025-07-30 NOTE — PHYSICAL EXAM
[de-identified] : Physical exam of the left shoulder: Swelling is improving. Mild swelling in her hand.  She is neurovascularly intact.  Good range of motion of the wrist and hand.  Her sensory and motor are intact.

## 2025-07-30 NOTE — DISCUSSION/SUMMARY
[de-identified] :  she is about 5 weeks from the injury.  She will discontinue the sling.  I provided her a prescription for therapy.  She will follow-up in a month for repeat evaluation.  Hopefully can discharge her at that point.  All questions were answered today.  She will continue following up with pain management.

## 2025-07-30 NOTE — HISTORY OF PRESENT ILLNESS
[de-identified] : Patient is an 81-year-old female here for repeat evaluation of her left shoulder.  She is about 5 weeks status post a displaced proximal humerus fracture.  She is accompanied by her daughter.  She is feeling better.